# Patient Record
Sex: MALE | Race: BLACK OR AFRICAN AMERICAN | Employment: FULL TIME | URBAN - METROPOLITAN AREA
[De-identification: names, ages, dates, MRNs, and addresses within clinical notes are randomized per-mention and may not be internally consistent; named-entity substitution may affect disease eponyms.]

---

## 2019-04-22 LAB
BASOPHILS ABSOLUTE: NORMAL /ΜL
BASOPHILS RELATIVE PERCENT: NORMAL %
BILIRUBIN, URINE: NEGATIVE
BLOOD, URINE: NEGATIVE
BUN BLDV-MCNC: 22 MG/DL
CALCIUM SERPL-MCNC: 8.9 MG/DL
CHLORIDE BLD-SCNC: 107 MMOL/L
CLARITY: CLEAR
CO2: 24 MMOL/L
COLOR: YELLOW
CREAT SERPL-MCNC: 1.62 MG/DL
EOSINOPHILS ABSOLUTE: NORMAL /ΜL
EOSINOPHILS RELATIVE PERCENT: NORMAL %
GFR CALCULATED: 55
GLUCOSE BLD-MCNC: 95 MG/DL
GLUCOSE URINE: NEGATIVE
HCT VFR BLD CALC: 43 % (ref 41–53)
HEMOGLOBIN: 13.9 G/DL (ref 13.5–17.5)
KETONES, URINE: NEGATIVE
LEUKOCYTE ESTERASE, URINE: NEGATIVE
LYMPHOCYTES ABSOLUTE: NORMAL /ΜL
LYMPHOCYTES RELATIVE PERCENT: NORMAL %
MCH RBC QN AUTO: NORMAL PG
MCHC RBC AUTO-ENTMCNC: NORMAL G/DL
MCV RBC AUTO: NORMAL FL
MONOCYTES ABSOLUTE: NORMAL /ΜL
MONOCYTES RELATIVE PERCENT: NORMAL %
NEUTROPHILS ABSOLUTE: NORMAL /ΜL
NEUTROPHILS RELATIVE PERCENT: NORMAL %
NITRITE, URINE: NEGATIVE
PH UA: 5 (ref 4.5–8)
PLATELET # BLD: 195 K/ΜL
PMV BLD AUTO: NORMAL FL
POTASSIUM SERPL-SCNC: 4.2 MMOL/L
PROTEIN UA: NEGATIVE
RBC # BLD: 4.82 10^6/ΜL
SODIUM BLD-SCNC: 138 MMOL/L
SPECIFIC GRAVITY, URINE: 1.02
UROBILINOGEN, URINE: NORMAL
WBC # BLD: 4.5 10^3/ML

## 2019-04-23 LAB
BUN BLDV-MCNC: 17 MG/DL
CALCIUM SERPL-MCNC: 9 MG/DL
CHLORIDE BLD-SCNC: 105 MMOL/L
CO2: 22 MMOL/L
CREAT SERPL-MCNC: 1.49 MG/DL
GFR CALCULATED: >60
GLUCOSE BLD-MCNC: 110 MG/DL
POTASSIUM SERPL-SCNC: 3.9 MMOL/L
SODIUM BLD-SCNC: 138 MMOL/L

## 2019-04-25 PROBLEM — I13.0 HYPERTENSIVE HEART AND KIDNEY DISEASE WITH HF AND WITH CKD STAGE I-IV (HCC): Status: ACTIVE | Noted: 2017-03-24

## 2019-04-25 PROBLEM — Z98.890 HISTORY OF LOOP RECORDER: Status: ACTIVE | Noted: 2017-03-24

## 2019-04-30 ENCOUNTER — OFFICE VISIT (OUTPATIENT)
Dept: NEPHROLOGY | Age: 49
End: 2019-04-30
Payer: COMMERCIAL

## 2019-04-30 VITALS
SYSTOLIC BLOOD PRESSURE: 153 MMHG | DIASTOLIC BLOOD PRESSURE: 105 MMHG | OXYGEN SATURATION: 97 % | HEART RATE: 64 BPM | BODY MASS INDEX: 29.27 KG/M2 | WEIGHT: 228 LBS

## 2019-04-30 DIAGNOSIS — I10 ESSENTIAL HYPERTENSION: Primary | ICD-10-CM

## 2019-04-30 DIAGNOSIS — N28.1 ACQUIRED COMPLEX CYST OF KIDNEY: ICD-10-CM

## 2019-04-30 DIAGNOSIS — N18.30 CKD (CHRONIC KIDNEY DISEASE), STAGE III (HCC): ICD-10-CM

## 2019-04-30 DIAGNOSIS — G47.30 SLEEP APNEA, UNSPECIFIED TYPE: ICD-10-CM

## 2019-04-30 PROCEDURE — 99204 OFFICE O/P NEW MOD 45 MIN: CPT | Performed by: INTERNAL MEDICINE

## 2019-04-30 RX ORDER — LOSARTAN POTASSIUM 100 MG/1
100 TABLET ORAL DAILY
COMMUNITY

## 2019-04-30 RX ORDER — LABETALOL 200 MG/1
200 TABLET, FILM COATED ORAL 3 TIMES DAILY
COMMUNITY
End: 2019-05-13 | Stop reason: SDUPTHER

## 2019-04-30 RX ORDER — FAMCICLOVIR 500 MG/1
1000 TABLET, FILM COATED ORAL 2 TIMES DAILY PRN
COMMUNITY

## 2019-04-30 RX ORDER — DOXAZOSIN MESYLATE 4 MG/1
4 TABLET ORAL 2 TIMES DAILY
COMMUNITY
End: 2019-05-07 | Stop reason: SDUPTHER

## 2019-04-30 NOTE — PATIENT INSTRUCTIONS
Drugs to Avoid with Chronic Kidney Disease    Non-steroidal anti-inflammatory drugs (NSAIDS)    Potential complication and side effects of NSAIDS include:   Kidney injury and worsening kidney function    Risk of stomach ulcer and intestinal bleeding   Fluid retention and edema   Increased Blood Pressure    Non-Steroidal Anti-Inflammatory Drugs     Aspirin (Anacin, Ascriptin, ivelisse, Bufferin, Ecotrin, Excedrin)   Celecoxib (Celebrex)   Choline and magnesium salicylates (CMT, Trisosal, Trilisate)   Choline salicylate (Arthropan)   Diclofenac (Voltaren, Arthrotec, Cataflam, Cambia, Voltaren-XR, Zipsor)   Diflunisal (Dolobid)   Etodolac (Lodine XL, Lodine)   Famotidine + Ibuprofen (Duexis)   Fenoprofen (Nalfon, Nalfon 200)   Furbiprofen (Asaid)   Ibuprofen (Advil, Motrin, Midol, Nuprin, Genpril, Dolgesic,Profen,, Vicoprofen,Combunox, Actiprofen, Addaprin, Caldolor, Haltran, Q-Profen,Ibren, Menadol, Rufen,Saleto-200, [place],Ultraprin, Uni-Pro,Wal-Profen)   Indomethacin (Indocin, Indomethegan, Indo-Sonali)    Ketoprofen (Orudis  KT, Oruvail, Actron)   Ketorolac (Toradol, Sprix)   Magnesium Sulfate (Arthritab, Ivelisse Select, Shar's pills, Karthikeyan, Mobidin, Mobogesic)   Meclofenamate Sodium (Ponstel)   Meloxicam (Mobic)   Naproxen (Aleve, Anaprox, Naprosyn, EC-Naprosyn, Naprelan, All Day Pain Relief, Aflaxen, Anaprox-DS, Midol Extended Relief, Naprelan,Prevacid NapraPac, Naprapac, Vimovo)   Nabumetone (Relafen)   Oxaprozin (Daypro)   Piroxicam (Feldene)   Rofecoxib (Vioxx)   Salsalate (Amigesic, Anaflex 750, Disalcid, Marthritic, Mono-gesic, Salflex, Salsitab)   Sodium salicylate (various generics)   Sulindac (Clinoril)   Tolmetin (Tolectin)   Valdecoxib (Bextra)   Mefenamic Acid (Ponstel)   Famotidine and Ibuprofen (Duexis)   Meclofenamate (Meclomen)    Antibiotics   Bactrim   Gentamycin   Tobramycin   Vancomycin   Tetracycline   Macrobid     Other                  IV contrast dye

## 2019-04-30 NOTE — PROGRESS NOTES
Nephrology Consult Note  Patient's Name: Zahraa Shin  9:36 AM  4/30/2019    Nephrologist: Kirill Garcia    Reason for Consult:  Hypertension  Requesting Physician:  No att. providers found  PCP: Sherrod Kehr, MD    Chief Complaint:  High blood pressure  Assessment   Diagnosis Orders   1. Essential hypertension     2. CKD (chronic kidney disease), stage III ([address]     3. Acquired complex cyst of kidney     4. Sleep apnea, unspecified type           Plan    1. I discussed my thoughts with the patient and he understood   2. Addressed his questions   3. Increase doxazosin to 8 mg po bid from 4 mg po bid   4. Increase labetalol to 400 mg po tid from 200 mg po tid   5. TSH  6. Plasma aldosterone level  7. Plasma renin level  8. Plasma metanephrine level  9. Low salt diet   10. No NSAIDS and list given to the patient   11. MRI abdomen for complex cyst  12. Need evaluation for sleep apnea   13. Urine protein/creatinine ratio  14. Markers of chronicity  15. REviewed records from [facility] and [facility] hospital  16. Return visit in 4 weeks       History Obtained From:  Patient   History of Present Ilness:    Zahraa Shin is a 50 y.o. male with history of hypertension GERD,gout ,atrial fibrillation asked to see us for hypertension. Reviewed of his record from [facility] revealed serum creatinine that has ranged form 1.5 mg/dl to 2.0 mg/dl. Was recently hospitalized at [facility] for hypertension and kidney ultrasound revealed right complx cyst  .    Past Medical History:   Diagnosis Date    CKD (chronic kidney disease)     GERD (gastroesophageal reflux disease)     Gout     Herpes     Hypertension     Paroxysmal atrial fibrillation (HCC)        Past Surgical History:   Procedure Laterality Date    HERNIA REPAIR         Family History   Problem Relation Age of Onset    Cancer Father     Hypertension Mother         reports that he has never smoked.  He has never used smokeless tobacco. He reports that he does not drink alcohol. Allergies:  Clonidine derivatives    Current Medications:    Current Outpatient Medications   Medication Sig Dispense Refill    famciclovir (FAMVIR) 500 MG tablet Take 500 mg by mouth 3 times daily      doxazosin (CARDURA) 4 MG tablet Take 4 mg by mouth 2 times daily      losartan (COZAAR) 100 MG tablet Take 100 mg by mouth daily      labetalol (NORMODYNE) 200 MG tablet Take 200 mg by mouth 3 times daily Hold if systolic <445 or heart rate <60      vardenafil (LEVITRA) 10 MG tablet Take 10 mg by mouth as needed for Erectile Dysfunction      lansoprazole (PREVACID) 30 MG capsule Take 30 mg by mouth daily      spironolactone (ALDACTONE) 50 MG tablet Take 50 mg by mouth daily       FEBUXOSTAT PO Take 40 mg by mouth daily       aspirin 325 MG tablet Take 325 mg by mouth daily       No current facility-administered medications for this visit. No current facility-administered medications for this visit. Review of Systems:   Pertinent positives stated above in HPI. All other systems were reviewed and were negative. ROS:Constitutional: negative  Eyes: negative  Ears, nose, mouth, throat, and face: negative  Respiratory: negative  Cardiovascular: negative  Gastrointestinal: negative  Genitourinary:negative  Integument/breast: negative  Hematologic/lymphatic: negative  Musculoskeletal:negative  Neurological: negative  Behavioral/Psych: negative  Endocrine: negative  Allergic/Immunologic: negative    Physical exam:   Constitutional:    Vitals:   Vitals:    04/30/19 0921   BP: (!) 153/105   Pulse: 64   SpO2: 97%       Skin: no rash, turgor is normal  Heent: Pupils are reactive to light and accomodation. Throat is clear  Neck: no bruits or jvd noted  Cardiovascular:  S1, S2 without murmur  Respiratory: Clear  Abdomen:  Soft,good bowel sound ,non tender and no abdominal bruit  Ext: No lower extremity edema  Psychiatric: mood and affect appropriate  Musculoskeletal:  Rom, muscular strength intact  CNS:Very awake and alert. Well oriented,normal speech with no focal deficit      Data:   Labs:  No results found for: NA, K, CL, CO2, CREATININE, BUN, GLUCOSE, MAG, PHOS, WBC, HGB, HCT, MCV, PLT  {Labs reviewed    Imaging:  CXR results:         Thank you Dr. Sherrod Kehr, MD for allowing us to participate in care of Zahraa Shin       Electronically signed by Belle Armenta MD on 4/30/2019 at 9:36 AM

## 2019-05-07 RX ORDER — DOXAZOSIN MESYLATE 4 MG/1
4 TABLET ORAL 2 TIMES DAILY
Qty: 180 TABLET | Refills: 3 | Status: SHIPPED | OUTPATIENT
Start: 2019-05-07 | End: 2019-05-14 | Stop reason: DRUGHIGH

## 2019-05-07 NOTE — TELEPHONE ENCOUNTER
Patient called need a refill on Doxazosin. Chart states 4mg BID. Patient states you talked about 8 mg daily.          Please Advise

## 2019-05-13 RX ORDER — LABETALOL 200 MG/1
200 TABLET, FILM COATED ORAL 3 TIMES DAILY
Qty: 270 TABLET | Refills: 3 | Status: SHIPPED | OUTPATIENT
Start: 2019-05-13 | End: 2019-07-22 | Stop reason: SDUPTHER

## 2019-05-13 NOTE — TELEPHONE ENCOUNTER
Kayla Bowers called today. He needs a refill for Labetalol 200 mg tid. Prescription is pending for signature.

## 2019-05-14 RX ORDER — DOXAZOSIN 8 MG/1
8 TABLET ORAL 2 TIMES DAILY
COMMUNITY
End: 2019-07-03 | Stop reason: ALTCHOICE

## 2019-05-17 LAB — CREATININE: 1.8 MG/DL

## 2019-05-20 DIAGNOSIS — N28.1 ACQUIRED COMPLEX CYST OF KIDNEY: ICD-10-CM

## 2019-05-24 LAB
BUN BLDV-MCNC: 20 MG/DL
CALCIUM SERPL-MCNC: 9.3 MG/DL
CHLORIDE BLD-SCNC: 105 MMOL/L
CO2: 20 MMOL/L
CREAT SERPL-MCNC: 1.7 MG/DL
GFR CALCULATED: 47
GLUCOSE BLD-MCNC: 97 MG/DL
POTASSIUM SERPL-SCNC: 4.5 MMOL/L
PTH INTACT: 127
SODIUM BLD-SCNC: 141 MMOL/L
TSH SERPL DL<=0.05 MIU/L-ACNC: 1.74 UIU/ML
VITAMIN D 25-HYDROXY: 19
VITAMIN D2, 25 HYDROXY: NORMAL
VITAMIN D3,25 HYDROXY: NORMAL

## 2019-05-31 ENCOUNTER — OFFICE VISIT (OUTPATIENT)
Dept: NEPHROLOGY | Age: 49
End: 2019-05-31
Payer: COMMERCIAL

## 2019-05-31 VITALS
WEIGHT: 220 LBS | HEART RATE: 69 BPM | OXYGEN SATURATION: 96 % | DIASTOLIC BLOOD PRESSURE: 104 MMHG | SYSTOLIC BLOOD PRESSURE: 149 MMHG | BODY MASS INDEX: 28.25 KG/M2

## 2019-05-31 DIAGNOSIS — I10 ESSENTIAL HYPERTENSION: Primary | ICD-10-CM

## 2019-05-31 DIAGNOSIS — G47.30 SLEEP APNEA, UNSPECIFIED TYPE: ICD-10-CM

## 2019-05-31 DIAGNOSIS — E55.9 VITAMIN D DEFICIENCY: ICD-10-CM

## 2019-05-31 DIAGNOSIS — N18.30 CKD (CHRONIC KIDNEY DISEASE), STAGE III (HCC): ICD-10-CM

## 2019-05-31 PROCEDURE — 99214 OFFICE O/P EST MOD 30 MIN: CPT | Performed by: INTERNAL MEDICINE

## 2019-05-31 RX ORDER — ACETAMINOPHEN 160 MG
2000 TABLET,DISINTEGRATING ORAL DAILY
COMMUNITY

## 2019-05-31 RX ORDER — MINOXIDIL 2.5 MG/1
5 TABLET ORAL 3 TIMES DAILY
Qty: 30 TABLET | Refills: 3 | Status: SHIPPED | OUTPATIENT
Start: 2019-05-31 | End: 2019-10-07 | Stop reason: ALTCHOICE

## 2019-05-31 NOTE — PROGRESS NOTES
Daniel Orellana, this patient does not have portal hypertension or cirrhosis, he has ascites likely related to heart failure even though they do not believe that is the case. They would like tunneled peritoneal cath placed. I put the order for that. Thank you. Renal Progress Note    Assessment and Plan:      Diagnosis Orders   1. Essential hypertension     2. CKD (chronic kidney disease), stage III ([facility])  Basic Metabolic Panel   3. Sleep apnea, unspecified type     4. Vitamin D deficiency       PLAN:  Stop spironolactone   Minoxidil 5 mg po tid   Take vitamin D 3 OTC 2000 IU daily  Metanephrine pending   Renin pending   Monitor blood pressure twice a day AM and PM beginning a week before appointment and bring the record to the office next time   Low salt diet   Need evaluated for sleep apnea   Return visit in 4 weeks     Patient Active Problem List   Diagnosis    CKD (chronic kidney disease) stage 3, GFR 30-59 ml/min ([facility])    History of loop recorder    Paroxysmal atrial fibrillation 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 [address]       Subjective:   Chief complaint:  Chief Complaint   Patient presents with    Hypertension    Chronic Kidney Disease     Stage III      HPI:This is a follow up visit for Mr Macho Patel here today for a follow up appointment . Saw him about 4 weeks ago for chronic kidney disease and uncontrolled blood pressure . Had his doxazosin increased to 8 mg po bid and labetalol increased to 400 mg po TID from 200 mg po bid . Doing better and brought home blood pressure which I reviewed ,is improved but still high    ROS:Constitutional: negative  Eyes: negative  Ears, nose, mouth, throat, and face: negative  Respiratory: negative  Cardiovascular: negative  Gastrointestinal: negative  Genitourinary:negative  Integument/breast: negative  Hematologic/lymphatic: negative  Musculoskeletal:negative  Neurological: negative  Behavioral/Psych: negative  Endocrine: negative  Allergic/Immunologic: negative      Medications:     Current Outpatient Medications   Medication Sig Dispense Refill    doxazosin (CARDURA) 8 MG tablet Take 8 mg by mouth 2 times daily      labetalol (NORMODYNE) 200 MG tablet Take 1 tablet by mouth Microalbumen/Creatinine ratio:  No components found for: RUCREAT    Objective:   Vitals: BP (!) 149/104 (Site: Right Upper Arm, Position: Sitting, Cuff Size: Large Adult)   Pulse 69   Wt 220 lb (99.8 kg)   SpO2 96%   BMI 28.25 kg/m²      Constitutional:  Alert, awake, no apparent distress  Skin:normal  HEENT:Pupils are reactive . Throat is clear  Neck:supple with no thyromegally  Cardiovascular:  S1, S2 without murmur  Respiratory:  Clear  Abdomen: +bs, soft, non tender  Ext: No LE edema  Musculoskeletal:Intact  Neuro:Alert and oriented with no deficit    Electronically signed by Virginia Galdamez MD on 5/31/2019 at 10:03 AM

## 2019-06-06 ENCOUNTER — TELEPHONE (OUTPATIENT)
Dept: NEPHROLOGY | Age: 49
End: 2019-06-06

## 2019-06-06 NOTE — TELEPHONE ENCOUNTER
I called the lab in regards to labs pt had done on 5/24/19. They faxed the catecholamines, but they stated there was a problem with the renin so it was not done. They are going to contact the pt to have it redrawn. This is just FYI.

## 2019-06-12 ENCOUNTER — TELEPHONE (OUTPATIENT)
Dept: NEPHROLOGY | Age: 49
End: 2019-06-12

## 2019-06-12 NOTE — TELEPHONE ENCOUNTER
Pt called and states his legs and feet started swelling up today. His face started swelling when he first started taking the minoxidil. What do you want to do?

## 2019-06-13 NOTE — TELEPHONE ENCOUNTER
Pt states his BP's have been running a lot better. He is going to decrease the minoxidil to 2.5 mg tid. He will call me next week w/BP readings and how his swelling is doing.

## 2019-06-14 LAB
BUN BLDV-MCNC: 20 MG/DL
CALCIUM SERPL-MCNC: 9.3 MG/DL
CHLORIDE BLD-SCNC: 109 MMOL/L
CO2: 24 MMOL/L
CREAT SERPL-MCNC: 1.7 MG/DL
GFR CALCULATED: 47
GLUCOSE BLD-MCNC: 91 MG/DL
POTASSIUM SERPL-SCNC: 4.1 MMOL/L
SODIUM BLD-SCNC: 142 MMOL/L

## 2019-06-19 RX ORDER — FUROSEMIDE 40 MG/1
40 TABLET ORAL DAILY
Qty: 30 TABLET | Refills: 1 | Status: SHIPPED | OUTPATIENT
Start: 2019-06-19

## 2019-06-19 RX ORDER — FUROSEMIDE 40 MG/1
40 TABLET ORAL DAILY
COMMUNITY
End: 2019-06-19 | Stop reason: SDUPTHER

## 2019-06-19 NOTE — TELEPHONE ENCOUNTER
Pt called back. Pt states he has not been taking the spironolactone. I sent script for furosemide to the pharmacy for the pt.

## 2019-06-19 NOTE — TELEPHONE ENCOUNTER
Pt states his BP went up when he took the minoxidil 2.5 tid, his BP went up and his swelling went down. He went back to the minoxidil to 5 mg tid, his BP got better, but his swelling got worse. What do you want to do?

## 2019-07-01 ENCOUNTER — OFFICE VISIT (OUTPATIENT)
Dept: NEPHROLOGY | Age: 49
End: 2019-07-01
Payer: COMMERCIAL

## 2019-07-01 VITALS
WEIGHT: 234.8 LBS | DIASTOLIC BLOOD PRESSURE: 76 MMHG | SYSTOLIC BLOOD PRESSURE: 127 MMHG | OXYGEN SATURATION: 99 % | HEART RATE: 68 BPM | BODY MASS INDEX: 30.15 KG/M2

## 2019-07-01 DIAGNOSIS — I10 ESSENTIAL HYPERTENSION: ICD-10-CM

## 2019-07-01 DIAGNOSIS — N25.81 HYPERPARATHYROIDISM, SECONDARY RENAL (HCC): ICD-10-CM

## 2019-07-01 DIAGNOSIS — N18.30 CKD (CHRONIC KIDNEY DISEASE), STAGE III (HCC): Primary | ICD-10-CM

## 2019-07-01 DIAGNOSIS — N28.1 ACQUIRED COMPLEX CYST OF KIDNEY: ICD-10-CM

## 2019-07-01 DIAGNOSIS — G47.30 SLEEP APNEA, UNSPECIFIED TYPE: ICD-10-CM

## 2019-07-01 DIAGNOSIS — E55.9 VITAMIN D DEFICIENCY: ICD-10-CM

## 2019-07-01 PROCEDURE — 99213 OFFICE O/P EST LOW 20 MIN: CPT | Performed by: INTERNAL MEDICINE

## 2019-07-01 RX ORDER — CIPROFLOXACIN 500 MG/1
TABLET, FILM COATED ORAL
Refills: 0 | COMMUNITY
Start: 2019-06-18 | End: 2019-09-13 | Stop reason: ALTCHOICE

## 2019-07-01 NOTE — PROGRESS NOTES
negative  Cardiovascular: positive for lower extremity edema  Gastrointestinal: negative  Genitourinary:negative  Integument/breast: negative  Hematologic/lymphatic: negative  Musculoskeletal:negative  Neurological: negative  Behavioral/Psych: negative  Endocrine: negative  Allergic/Immunologic: negative     Medications:     Current Outpatient Medications   Medication Sig Dispense Refill    ciprofloxacin (CIPRO) 500 MG tablet TAKE 1 TABLET BY MOUTH EVERY 12 HOURS FOR 14 DAYS  0    furosemide (LASIX) 40 MG tablet Take 1 tablet by mouth daily 30 tablet 1    minoxidil (LONITEN) 2.5 MG tablet Take 2 tablets by mouth 3 times daily 30 tablet 3    Cholecalciferol (VITAMIN D3) 2000 units CAPS Take 2,000 Units by mouth daily      doxazosin (CARDURA) 8 MG tablet Take 8 mg by mouth 2 times daily      labetalol (NORMODYNE) 200 MG tablet Take 1 tablet by mouth 3 times daily Hold if systolic <007 or heart rate <60 (Patient taking differently: Take 400 mg by mouth 3 times daily ) 270 tablet 3    famciclovir (FAMVIR) 500 MG tablet Take 1,000 mg by mouth 2 times daily as needed       losartan (COZAAR) 100 MG tablet Take 100 mg by mouth daily      vardenafil (LEVITRA) 10 MG tablet Take 10 mg by mouth as needed for Erectile Dysfunction      lansoprazole (PREVACID) 30 MG capsule Take 30 mg by mouth daily      FEBUXOSTAT PO Take 40 mg by mouth daily       aspirin 325 MG tablet Take 325 mg by mouth daily       No current facility-administered medications for this visit.         Lab Results:    CBC:   Lab Results   Component Value Date    WBC 4.5 04/22/2019    HGB 13.9 04/22/2019    HCT 43.0 04/22/2019     04/22/2019     BMP:    Lab Results   Component Value Date     06/14/2019     05/24/2019     04/23/2019    K 4.1 06/14/2019    K 4.5 05/24/2019    K 3.9 04/23/2019     06/14/2019     05/24/2019     04/23/2019    CO2 24 06/14/2019    CO2 20 05/24/2019    CO2 22 04/23/2019    BUN 20

## 2019-07-01 NOTE — PATIENT INSTRUCTIONS
Stop doxazosin   Please have sleep studies done for possible sleep apnea. Sleep apnea sometimes make blood pressure difficult to control.

## 2019-07-10 ENCOUNTER — TELEPHONE (OUTPATIENT)
Dept: NEPHROLOGY | Age: 49
End: 2019-07-10

## 2019-07-22 RX ORDER — LABETALOL 200 MG/1
400 TABLET, FILM COATED ORAL 3 TIMES DAILY
Qty: 180 TABLET | Refills: 3 | Status: SHIPPED | OUTPATIENT
Start: 2019-07-22

## 2019-09-05 LAB
BUN BLDV-MCNC: 19 MG/DL
CALCIUM SERPL-MCNC: 9.5 MG/DL
CHLORIDE BLD-SCNC: 106 MMOL/L
CO2: 26 MMOL/L
CREAT SERPL-MCNC: 2 MG/DL
GFR CALCULATED: 38
GLUCOSE BLD-MCNC: 79 MG/DL
POTASSIUM SERPL-SCNC: 4.4 MMOL/L
SODIUM BLD-SCNC: 142 MMOL/L

## 2019-09-09 ENCOUNTER — TELEPHONE (OUTPATIENT)
Dept: NEPHROLOGY | Age: 49
End: 2019-09-09

## 2019-09-13 ENCOUNTER — OFFICE VISIT (OUTPATIENT)
Dept: NEPHROLOGY | Age: 49
End: 2019-09-13
Payer: COMMERCIAL

## 2019-09-13 VITALS
HEART RATE: 67 BPM | SYSTOLIC BLOOD PRESSURE: 162 MMHG | WEIGHT: 228 LBS | DIASTOLIC BLOOD PRESSURE: 100 MMHG | BODY MASS INDEX: 29.27 KG/M2 | OXYGEN SATURATION: 98 %

## 2019-09-13 DIAGNOSIS — N25.81 HYPERPARATHYROIDISM, SECONDARY RENAL (HCC): ICD-10-CM

## 2019-09-13 DIAGNOSIS — E55.9 VITAMIN D DEFICIENCY: ICD-10-CM

## 2019-09-13 DIAGNOSIS — I10 ESSENTIAL HYPERTENSION: ICD-10-CM

## 2019-09-13 DIAGNOSIS — N18.30 CKD (CHRONIC KIDNEY DISEASE), STAGE III (HCC): Primary | ICD-10-CM

## 2019-09-13 DIAGNOSIS — N28.1 ACQUIRED COMPLEX CYST OF KIDNEY: ICD-10-CM

## 2019-09-13 PROCEDURE — 99214 OFFICE O/P EST MOD 30 MIN: CPT | Performed by: INTERNAL MEDICINE

## 2019-09-13 RX ORDER — HYDRALAZINE HYDROCHLORIDE 10 MG/1
10 TABLET, FILM COATED ORAL EVERY 12 HOURS PRN
Qty: 90 TABLET | Refills: 3 | Status: SHIPPED | OUTPATIENT
Start: 2019-09-13 | End: 2019-09-30

## 2019-09-13 NOTE — PROGRESS NOTES
2,000 Units by mouth daily      famciclovir (FAMVIR) 500 MG tablet Take 1,000 mg by mouth 2 times daily as needed       losartan (COZAAR) 100 MG tablet Take 100 mg by mouth daily      vardenafil (LEVITRA) 10 MG tablet Take 10 mg by mouth as needed for Erectile Dysfunction      lansoprazole (PREVACID) 30 MG capsule Take 30 mg by mouth daily      FEBUXOSTAT PO Take 40 mg by mouth daily       aspirin 325 MG tablet Take 325 mg by mouth daily       No current facility-administered medications for this visit.         Lab Results:    CBC:   Lab Results   Component Value Date    WBC 4.5 04/22/2019    HGB 13.9 04/22/2019    HCT 43.0 04/22/2019     04/22/2019     BMP:    Lab Results   Component Value Date     09/05/2019     06/14/2019     05/24/2019    K 4.4 09/05/2019    K 4.1 06/14/2019    K 4.5 05/24/2019     09/05/2019     06/14/2019     05/24/2019    CO2 26 09/05/2019    CO2 24 06/14/2019    CO2 20 05/24/2019    BUN 19 09/05/2019    BUN 20 06/14/2019    BUN 20 05/24/2019    CREATININE 2.0 09/05/2019    CREATININE 1.7 06/14/2019    CREATININE 1.7 05/24/2019    GLUCOSE 79 09/05/2019    GLUCOSE 91 06/14/2019    GLUCOSE 97 05/24/2019      Hepatic: No results found for: AST, ALT, ALB, BILITOT, ALKPHOS  BNP: No results found for: BNP  Lipids: No results found for: CHOL, HDL  INR: No results found for: INR  URINE: No results found for: Jan Trav  Lab Results   Component Value Date    NITRU Negative 04/22/2019    COLORU Yellow 04/22/2019    PHUR 5.0 04/22/2019    CLARITYU Clear 04/22/2019    LEUKOCYTESUR Negative 04/22/2019    UROBILINOGEN Normal 04/22/2019    BILIRUBINUR Negative 04/22/2019    BLOODU Negative 04/22/2019    GLUCOSEU negative 04/22/2019    KETUA Negative 04/22/2019      Microalbumen/Creatinine ratio:  No components found for: RUCREAT    Objective:   Vitals: BP (!) 162/100 (Site: Left Upper Arm, Position: Sitting, Cuff Size: Large Adult)   Pulse 67   Wt 228 lb

## 2019-09-23 ENCOUNTER — TELEPHONE (OUTPATIENT)
Dept: NEPHROLOGY | Age: 49
End: 2019-09-23

## 2019-09-30 ENCOUNTER — TELEPHONE (OUTPATIENT)
Dept: NEPHROLOGY | Age: 49
End: 2019-09-30

## 2019-09-30 RX ORDER — HYDRALAZINE HYDROCHLORIDE 10 MG/1
20 TABLET, FILM COATED ORAL 3 TIMES DAILY
Qty: 90 TABLET | Refills: 3 | Status: SHIPPED | OUTPATIENT
Start: 2019-09-30 | End: 2019-10-07 | Stop reason: DRUGHIGH

## 2019-09-30 NOTE — TELEPHONE ENCOUNTER
Spoke to patient. Updated medication list. Hydralazine 20 mg TID pending for signature. Patient to take routinely 3 x daily and keep  track of bp readings and call the office back with an update. Patient verbalized understanding.

## 2019-10-01 ENCOUNTER — TELEPHONE (OUTPATIENT)
Dept: NEPHROLOGY | Age: 49
End: 2019-10-01

## 2019-10-07 RX ORDER — HYDRALAZINE HYDROCHLORIDE 50 MG/1
50 TABLET, FILM COATED ORAL 3 TIMES DAILY
Qty: 90 TABLET | Refills: 3 | Status: SHIPPED | OUTPATIENT
Start: 2019-10-07 | End: 2019-10-16

## 2019-10-07 RX ORDER — HYDRALAZINE HYDROCHLORIDE 50 MG/1
50 TABLET, FILM COATED ORAL 3 TIMES DAILY
COMMUNITY
End: 2019-10-07 | Stop reason: SDUPTHER

## 2019-10-14 ENCOUNTER — TELEPHONE (OUTPATIENT)
Dept: NEPHROLOGY | Age: 49
End: 2019-10-14

## 2019-10-15 ENCOUNTER — TELEPHONE (OUTPATIENT)
Dept: NEPHROLOGY | Age: 49
End: 2019-10-15

## 2019-10-16 RX ORDER — HYDRALAZINE HYDROCHLORIDE 100 MG/1
100 TABLET, FILM COATED ORAL 3 TIMES DAILY
Qty: 90 TABLET | Refills: 1 | Status: SHIPPED | OUTPATIENT
Start: 2019-10-16

## 2019-10-24 ENCOUNTER — TELEPHONE (OUTPATIENT)
Dept: NEPHROLOGY | Age: 49
End: 2019-10-24

## 2019-10-25 ENCOUNTER — TELEPHONE (OUTPATIENT)
Dept: NEPHROLOGY | Age: 49
End: 2019-10-25